# Patient Record
Sex: MALE | Race: WHITE | URBAN - METROPOLITAN AREA
[De-identification: names, ages, dates, MRNs, and addresses within clinical notes are randomized per-mention and may not be internally consistent; named-entity substitution may affect disease eponyms.]

---

## 2022-01-07 ENCOUNTER — TELEPHONE (OUTPATIENT)
Dept: INTERNAL MEDICINE | Facility: CLINIC | Age: 77
End: 2022-01-07

## 2022-01-07 NOTE — TELEPHONE ENCOUNTER
I would recommend that he have a release of information form signed so that he can receive his previous medical records.

## 2022-01-07 NOTE — TELEPHONE ENCOUNTER
Patient has requested that you be his primary.  He had seen you in your previous clinic  Requesting pcp be changed on chart.  Would patient need to ask for ALEA for faxed records?  This writer looked under Care Everywhere and only had seen [facility] and health [facility] for physical therapy    Please advise  Please call patient if needs to request records sent.  Thanks

## 2022-01-07 NOTE — TELEPHONE ENCOUNTER
Patient advised he should complete ALEA to have records sent to clinic. Informed patient he could fill this out at previous clinic or our office. Patient is currently in [place] for the winter, he asks if form can be sent by email to him and he will send it back. Email address: [email or web address]    Also advised patient to schedule appointment to establish care when he is back in [place].  ALEA emailed to patient.     Catrina Templeton RN  [facility]

## 2022-05-11 ENCOUNTER — TELEPHONE (OUTPATIENT)
Dept: INTERNAL MEDICINE | Facility: CLINIC | Age: 77
End: 2022-05-11

## 2022-05-11 NOTE — TELEPHONE ENCOUNTER
Patient is calling to ask if Dr Robb will place an order for him to go to [facility] for PHYSICAL THERAPY for his back pain and left sciatic nerve pain. fax for [facility] [phone].  Patient states Dr Robb has ordered this for him when he saw him at his other clinic.

## 2023-08-25 ENCOUNTER — NURSE TRIAGE (OUTPATIENT)
Dept: NURSING | Facility: CLINIC | Age: 78
End: 2023-08-25

## 2023-08-25 NOTE — TELEPHONE ENCOUNTER
Patient spoke to clinic earlier about switching back to his lisinopril. He switched to losartan last week but is not tolerating it. Clinic told him to switch back and were working on getting him a new prescription. Nothing has been sent and it is now after hours/the weekend. Patient has 5mg tablets left. He was on 10mg previously so will take 2 of them over the weekend until he can speak with his care team on Monday.     Jose Elias Robertson RN on 8/25/2023 at 5:33 PM    Reason for Disposition   [1] Caller has NON-URGENT medicine question about med that PCP prescribed AND [2] triager unable to answer question    Additional Information   Negative: [1] Intentional drug overdose AND [2] suicidal thoughts or ideas   Negative: MORE THAN A DOUBLE DOSE of a prescription or over-the-counter (OTC) drug   Negative: [1] DOUBLE DOSE (an extra dose or lesser amount) of prescription drug AND [2] any symptoms (e.g., dizziness, nausea, pain, sleepiness)   Negative: [1] DOUBLE DOSE (an extra dose or lesser amount) of over-the-counter (OTC) drug AND [2] any symptoms (e.g., dizziness, nausea, pain, sleepiness)   Negative: Took another person's prescription drug   Negative: [1] DOUBLE DOSE (an extra dose or lesser amount) of prescription drug AND [2] NO symptoms  (Exception: A double dose of antibiotics.)   Negative: Diabetes drug error or overdose (e.g., took wrong type of insulin or took extra dose)   Negative: [1] Prescription not at pharmacy AND [2] was prescribed by PCP recently (Exception: Triager has access to EMR and prescription is recorded there. Go to Home Care and confirm for pharmacy.)   Negative: [1] Pharmacy calling with prescription question AND [2] triager unable to answer question   Negative: [1] Caller has URGENT medicine question about med that PCP or specialist prescribed AND [2] triager unable to answer question   Negative: Medicine patch causing local rash or itching   Negative: [1] Caller has medicine question about  med NOT prescribed by PCP AND [2] triager unable to answer question (e.g., compatibility with other med, storage)   Negative: Prescription request for new medicine (not a refill)    Protocols used: Medication Question Call-A-AH

## 2024-01-03 RX ORDER — GABAPENTIN 100 MG/1
CAPSULE ORAL
Qty: 270 CAPSULE | Refills: 0 | OUTPATIENT
Start: 2024-01-03